# Patient Record
Sex: MALE | Race: OTHER | NOT HISPANIC OR LATINO
[De-identification: names, ages, dates, MRNs, and addresses within clinical notes are randomized per-mention and may not be internally consistent; named-entity substitution may affect disease eponyms.]

---

## 2022-11-09 ENCOUNTER — NON-APPOINTMENT (OUTPATIENT)
Age: 22
End: 2022-11-09

## 2022-11-09 ENCOUNTER — APPOINTMENT (OUTPATIENT)
Dept: ORTHOPEDIC SURGERY | Facility: CLINIC | Age: 22
End: 2022-11-09

## 2022-11-09 VITALS
HEART RATE: 80 BPM | HEIGHT: 70 IN | BODY MASS INDEX: 32.35 KG/M2 | DIASTOLIC BLOOD PRESSURE: 75 MMHG | WEIGHT: 226 LBS | SYSTOLIC BLOOD PRESSURE: 115 MMHG | OXYGEN SATURATION: 98 %

## 2022-11-09 PROBLEM — Z00.00 ENCOUNTER FOR PREVENTIVE HEALTH EXAMINATION: Status: ACTIVE | Noted: 2022-11-09

## 2022-11-09 PROCEDURE — 73130 X-RAY EXAM OF HAND: CPT | Mod: RT

## 2022-11-09 PROCEDURE — 29125 APPL SHORT ARM SPLINT STATIC: CPT | Mod: RT

## 2022-11-09 PROCEDURE — 99204 OFFICE O/P NEW MOD 45 MIN: CPT | Mod: 57

## 2022-11-11 ENCOUNTER — APPOINTMENT (OUTPATIENT)
Dept: CT IMAGING | Facility: IMAGING CENTER | Age: 22
End: 2022-11-11

## 2022-11-11 ENCOUNTER — RESULT REVIEW (OUTPATIENT)
Age: 22
End: 2022-11-11

## 2022-11-11 ENCOUNTER — OUTPATIENT (OUTPATIENT)
Dept: OUTPATIENT SERVICES | Facility: HOSPITAL | Age: 22
LOS: 1 days | End: 2022-11-11
Payer: MEDICAID

## 2022-11-11 ENCOUNTER — OUTPATIENT (OUTPATIENT)
Dept: OUTPATIENT SERVICES | Facility: HOSPITAL | Age: 22
LOS: 1 days | End: 2022-11-11

## 2022-11-11 VITALS
TEMPERATURE: 98 F | OXYGEN SATURATION: 98 % | HEIGHT: 70 IN | WEIGHT: 225.97 LBS | SYSTOLIC BLOOD PRESSURE: 133 MMHG | DIASTOLIC BLOOD PRESSURE: 75 MMHG | RESPIRATION RATE: 16 BRPM | HEART RATE: 83 BPM

## 2022-11-11 DIAGNOSIS — S62.314B: ICD-10-CM

## 2022-11-11 DIAGNOSIS — Z00.00 ENCOUNTER FOR GENERAL ADULT MEDICAL EXAMINATION WITHOUT ABNORMAL FINDINGS: ICD-10-CM

## 2022-11-11 DIAGNOSIS — R52 PAIN, UNSPECIFIED: Chronic | ICD-10-CM

## 2022-11-11 DIAGNOSIS — R52 PAIN, UNSPECIFIED: ICD-10-CM

## 2022-11-11 PROCEDURE — 76376 3D RENDER W/INTRP POSTPROCES: CPT

## 2022-11-11 PROCEDURE — 73200 CT UPPER EXTREMITY W/O DYE: CPT

## 2022-11-11 PROCEDURE — 73200 CT UPPER EXTREMITY W/O DYE: CPT | Mod: 26,RT

## 2022-11-11 PROCEDURE — 76376 3D RENDER W/INTRP POSTPROCES: CPT | Mod: 26

## 2022-11-11 NOTE — H&P PST ADULT - NSICDXPASTMEDICALHX_GEN_ALL_CORE_FT
PAST MEDICAL HISTORY:  Fracture fourth metacarpal base and fifth carpmetacrpal fracture in November 2022

## 2022-11-11 NOTE — H&P PST ADULT - HISTORY OF PRESENT ILLNESS
This is a 21 y/o male who presents with recent injury to his right hand when he punched the wall. Visited Urgent care and then saw specialist with xrays and CT scan confirming pathology. Splint applies. Scheduled for right closed versus open treatment fourth metacarpal base fracture fifth carpometacarpal fracture dislocation This is a 23 y/o male who presents with recent injury to his right hand when he punched the wall. Visited Urgent care and then saw specialist with xrays and CT scan confirming pathology. Splint applied. Scheduled for right closed versus open treatment fourth metacarpal base fracture fifth carpometacarpal fracture dislocation

## 2022-11-14 ENCOUNTER — TRANSCRIPTION ENCOUNTER (OUTPATIENT)
Age: 22
End: 2022-11-14

## 2022-11-14 VITALS
OXYGEN SATURATION: 99 % | TEMPERATURE: 98 F | DIASTOLIC BLOOD PRESSURE: 87 MMHG | RESPIRATION RATE: 16 BRPM | HEART RATE: 78 BPM | SYSTOLIC BLOOD PRESSURE: 132 MMHG | WEIGHT: 224.87 LBS | HEIGHT: 70 IN

## 2022-11-14 NOTE — ASU PREOPERATIVE ASSESSMENT, ADULT ([facility] ONLY) - FALL HARM RISK - HARM RISK INTERVENTIONS

## 2022-11-15 ENCOUNTER — APPOINTMENT (OUTPATIENT)
Dept: ORTHOPEDIC SURGERY | Facility: AMBULATORY SURGERY CENTER | Age: 22
End: 2022-11-15

## 2022-11-15 ENCOUNTER — OUTPATIENT (OUTPATIENT)
Dept: OUTPATIENT SERVICES | Facility: HOSPITAL | Age: 22
LOS: 1 days | Discharge: HOME | End: 2022-11-15

## 2022-11-15 ENCOUNTER — TRANSCRIPTION ENCOUNTER (OUTPATIENT)
Age: 22
End: 2022-11-15

## 2022-11-15 VITALS
SYSTOLIC BLOOD PRESSURE: 121 MMHG | HEART RATE: 81 BPM | RESPIRATION RATE: 81 BRPM | OXYGEN SATURATION: 100 % | DIASTOLIC BLOOD PRESSURE: 76 MMHG

## 2022-11-15 DIAGNOSIS — S62.314B: ICD-10-CM

## 2022-11-15 DIAGNOSIS — R52 PAIN, UNSPECIFIED: Chronic | ICD-10-CM

## 2022-11-15 PROCEDURE — 26686 TREAT HAND DISLOCATION: CPT | Mod: RT

## 2022-11-15 DEVICE — K-WIRE S&N DOUBLE TROCAR 0.045" X 4": Type: IMPLANTABLE DEVICE | Status: FUNCTIONAL

## 2022-11-15 RX ORDER — CEPHALEXIN 500 MG
1 CAPSULE ORAL
Qty: 40 | Refills: 0
Start: 2022-11-15 | End: 2022-11-24

## 2022-11-15 RX ORDER — OXYCODONE 5 MG/1
5 TABLET ORAL
Qty: 10 | Refills: 0 | Status: ACTIVE | COMMUNITY
Start: 2022-11-15 | End: 1900-01-01

## 2022-11-15 NOTE — ASU DISCHARGE PLAN (ADULT/PEDIATRIC) - CARE PROVIDER_API CALL
Lucio Negro)  Orthopedics  [phone] [place] [address]  Phone: [phone]  Fax: [phone]  Follow Up Time: 2 weeks

## 2022-11-28 ENCOUNTER — APPOINTMENT (OUTPATIENT)
Dept: ORTHOPEDIC SURGERY | Facility: CLINIC | Age: 22
End: 2022-11-28

## 2022-11-28 VITALS
WEIGHT: 226 LBS | DIASTOLIC BLOOD PRESSURE: 73 MMHG | TEMPERATURE: 97.1 F | HEART RATE: 84 BPM | OXYGEN SATURATION: 99 % | SYSTOLIC BLOOD PRESSURE: 130 MMHG | HEIGHT: 70 IN | BODY MASS INDEX: 32.35 KG/M2

## 2022-11-28 PROCEDURE — 99024 POSTOP FOLLOW-UP VISIT: CPT

## 2022-11-28 PROCEDURE — 73130 X-RAY EXAM OF HAND: CPT | Mod: RT

## 2022-11-28 PROCEDURE — 29130 APPL FINGER SPLINT STATIC: CPT | Mod: RT

## 2022-12-02 NOTE — HISTORY OF PRESENT ILLNESS
[de-identified] : Open reduction percutaneous pinning 4th and 5th CMC fracture dislocations\par 11/15/22 [de-identified] : Doing well\par Pain well controlled and not requiring narcotics\par Denies signs and symptoms of infection\par Denies sensory deficits/ changes [de-identified] : Sutures removed and steris applied without dehiscence\par Volar and dorsal slab splint fashioned with MP extended and immobilized but IP free (included digits 3-5)\par No evidence of erythema nor drainage about pins\par  [de-identified] : XR R Hand:  Stably reduced 4th and 5th CMC joints with stable hardware placement from prior [de-identified] : s/p aforementioned procedure and doing well\par \par OT to fashion thermoplast splint with MP extended and IPs free, forearm based (digits 3-5)\par NWB RUE\par Keep pins clean, dry and intact\par Call with questions or concerns

## 2022-12-06 RX ORDER — IBUPROFEN 200 MG
1 TABLET ORAL
Qty: 0 | Refills: 0 | DISCHARGE

## 2022-12-07 ENCOUNTER — APPOINTMENT (OUTPATIENT)
Dept: ORTHOPEDIC SURGERY | Facility: CLINIC | Age: 22
End: 2022-12-07
Payer: MEDICAID

## 2022-12-07 PROCEDURE — 73130 X-RAY EXAM OF HAND: CPT | Mod: RT

## 2022-12-07 PROCEDURE — 99024 POSTOP FOLLOW-UP VISIT: CPT

## 2022-12-12 ENCOUNTER — APPOINTMENT (OUTPATIENT)
Dept: ORTHOPEDIC SURGERY | Facility: CLINIC | Age: 22
End: 2022-12-12
Payer: MEDICAID

## 2022-12-12 PROCEDURE — 99024 POSTOP FOLLOW-UP VISIT: CPT

## 2022-12-18 NOTE — HISTORY OF PRESENT ILLNESS
[de-identified] : Open reduction percutaneous pinning 4th and 5th CMC fracture dislocations\par 11/15/22 [de-identified] : Doing much better with pain\par OT had adjusted splint to keep MP extended [de-identified] : Incision c/d/i and appears well-healing\par Thermoplast splint in place\par No evidence of erythema nor drainage about pins\par  [de-identified] : s/p aforementioned procedure and doing well\par \par  [de-identified] : Keep pins c/d/i\par F/u in 1-2 weeks for pins out and XR with pins removed

## 2022-12-18 NOTE — HISTORY OF PRESENT ILLNESS
[de-identified] : Open reduction percutaneous pinning 4th and 5th CMC fracture dislocations\par 11/15/22 [de-identified] : Had issues with pain and swelling after running in his splint\par This has improved [de-identified] : Incision c/d/i and appears well-healing\par Thermoplast splint in place\par No evidence of erythema nor drainage about pins- however pin did migrate inwards (still external to skin) with no pressure erosion at skin edge\par  [de-identified] : XR R Hand: Maintained reduction of R 4th and 5th CMC fracture dislocations with stable hardware placement [de-identified] : s/p aforementioned procedure and doing well\par \par  [de-identified] : Keep pins c/d/i\par F/u in 2 weeks for pins out and XR with pins removed

## 2022-12-21 ENCOUNTER — APPOINTMENT (OUTPATIENT)
Dept: ORTHOPEDIC SURGERY | Facility: CLINIC | Age: 22
End: 2022-12-21
Payer: MEDICAID

## 2022-12-21 PROBLEM — T14.8XXA OTHER INJURY OF UNSPECIFIED BODY REGION, INITIAL ENCOUNTER: Chronic | Status: ACTIVE | Noted: 2022-11-11

## 2022-12-21 PROCEDURE — 99024 POSTOP FOLLOW-UP VISIT: CPT

## 2022-12-21 PROCEDURE — 73130 X-RAY EXAM OF HAND: CPT | Mod: RT

## 2023-01-11 ENCOUNTER — APPOINTMENT (OUTPATIENT)
Dept: ORTHOPEDIC SURGERY | Facility: CLINIC | Age: 23
End: 2023-01-11
Payer: MEDICAID

## 2023-01-11 PROCEDURE — 99024 POSTOP FOLLOW-UP VISIT: CPT

## 2023-01-11 PROCEDURE — 73130 X-RAY EXAM OF HAND: CPT | Mod: RT

## 2023-02-15 ENCOUNTER — APPOINTMENT (OUTPATIENT)
Dept: ORTHOPEDIC SURGERY | Facility: CLINIC | Age: 23
End: 2023-02-15
Payer: MEDICAID

## 2023-02-15 DIAGNOSIS — S69.91XD UNSPECIFIED INJURY OF RIGHT WRIST, HAND AND FINGER(S), SUBSEQUENT ENCOUNTER: ICD-10-CM

## 2023-02-15 PROCEDURE — 73130 X-RAY EXAM OF HAND: CPT | Mod: RT

## 2023-02-15 PROCEDURE — 99212 OFFICE O/P EST SF 10 MIN: CPT

## 2023-05-19 ENCOUNTER — APPOINTMENT (OUTPATIENT)
Dept: ORTHOPEDIC SURGERY | Facility: CLINIC | Age: 23
End: 2023-05-19

## 2024-04-26 NOTE — H&P PST ADULT - PROBLEM SELECTOR PLAN 1
calm This is a 21 y/o male who is scheduled for right closed versus open treatment fourth metacarpal base fracture, fifth carpometacarpal fracture dislocation on 11-15-22  * Ordered covid-19 pcr; given phone number and places that do covid testing to be done 3-5 days before surgery   * Given preop instructions

## 2024-05-27 NOTE — ASU PREOP CHECKLIST - HAND OFF
Goal Outcome Evaluation:  Plan of Care Reviewed With: patient        Progress: no change      Vss. No acute changes. Rested well. Will continue plan of care.                            Unit RN to OR RN

## 2024-11-18 ENCOUNTER — EMERGENCY (EMERGENCY)
Facility: HOSPITAL | Age: 24
LOS: 0 days | Discharge: HOME | End: 2024-11-18
Attending: STUDENT IN AN ORGANIZED HEALTH CARE EDUCATION/TRAINING PROGRAM
Payer: COMMERCIAL

## 2024-11-18 VITALS
TEMPERATURE: 100 F | HEART RATE: 68 BPM | SYSTOLIC BLOOD PRESSURE: 131 MMHG | WEIGHT: 188.94 LBS | DIASTOLIC BLOOD PRESSURE: 67 MMHG | OXYGEN SATURATION: 100 % | RESPIRATION RATE: 18 BRPM | HEIGHT: 70 IN
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
## 2024-11-18 DIAGNOSIS — S92.325A NONDISPLACED FRACTURE OF SECOND METATARSAL BONE, LEFT FOOT, INITIAL ENCOUNTER FOR CLOSED FRACTURE: ICD-10-CM
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
## 2024-11-18 DIAGNOSIS — S92.342A DISPLACED FRACTURE OF FOURTH METATARSAL BONE, LEFT FOOT, INITIAL ENCOUNTER FOR CLOSED FRACTURE: ICD-10-CM

## 2024-11-18 DIAGNOSIS — R52 PAIN, UNSPECIFIED: Chronic | ICD-10-CM

## 2024-11-18 DIAGNOSIS — M79.672 PAIN IN LEFT FOOT: ICD-10-CM
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
## 2024-11-18 PROCEDURE — 99282 EMERGENCY DEPT VISIT SF MDM: CPT

## 2024-11-18 PROCEDURE — 73630 X-RAY EXAM OF FOOT: CPT | Mod: 26,LT

## 2024-11-18 PROCEDURE — 73590 X-RAY EXAM OF LOWER LEG: CPT | Mod: LT

## 2024-11-18 PROCEDURE — 76000 FLUOROSCOPY <1 HR PHYS/QHP: CPT

## 2024-11-18 PROCEDURE — 73590 X-RAY EXAM OF LOWER LEG: CPT | Mod: 26,LT

## 2024-11-18 PROCEDURE — 73630 X-RAY EXAM OF FOOT: CPT | Mod: LT

## 2024-11-18 PROCEDURE — 73610 X-RAY EXAM OF ANKLE: CPT | Mod: LT

## 2024-11-18 PROCEDURE — 99285 EMERGENCY DEPT VISIT HI MDM: CPT

## 2024-11-18 PROCEDURE — 28475 CLTX METATARSAL FX W/MNPJ EA: CPT | Mod: T3

## 2024-11-18 PROCEDURE — 73610 X-RAY EXAM OF ANKLE: CPT | Mod: 26,LT

## 2024-11-18 PROCEDURE — 99285 EMERGENCY DEPT VISIT HI MDM: CPT | Mod: 25

## 2024-11-18 NOTE — ED PROVIDER NOTE - NSFOLLOWUPINSTRUCTIONS_ED_ALL_ED_FT
1.  Please follow-up with Dr. Randolph from podiatry regarding your foot fractures.  2.  Please use the crutches whenever up and moving around.  3.  Please use ice to help with swelling.  4.  You can take acetaminophen and ibuprofen as needed for pain.  5.  Please return to the ER if develop worsening swelling, numbness or tingling, fever, redness of your foot or anything of concern.

## 2024-11-18 NOTE — CONSULT NOTE ADULT - ASSESSMENT
A: 24yr old male seen for the following:   - Left foot displaced fracture of the fourth metatarsal head  - Left foot non-displaced fracture of the third and second metartaral    P:   Chart reviewed and Patient evaluated;  Discussed diagnosis and treatment with patient.   X-rays reviewed : on wet read showing left foot displaced fracture of the fourth metatarsal head and non displaced fx of the third and second metatarsals  The displaced fracture of the fourth metatarsal left foot was partially reduced after administering 20cc of 1%plaine lidocaine under c arm fluoroscopy. Please see procedure note for detail.  Applied soft gibbs compressive dressing and posterior splint  Please leave dressings clean, dry and intact  Non-weight bearing  to left foot   RICE therapy to the left foot  Pt to f/u with Dr Rosario within 1 week of discharge  All additional care per ED appreciated  Patient demonstrated verbal understanding of all interventions and tolerated interventions well without any complications.         Case D/W attending Dr. Rosario

## 2024-11-18 NOTE — ED PROVIDER NOTE - PHYSICAL EXAMINATION
GEN: Patient awake alert NAD.   HEENT: normocephalic, atraumatic, EOMI, no scleral icterus, moist MM  CARDIAC: RRR, S1, S2, no murmur.   PULM: CTA B/L no wheeze, rhonchi, rales.   ABD: soft NT, ND, no rebound no guarding,  MSK: Moving all extremities, no edema. 5/5 strength and full ROM in all extremities. Tenderness of the midfoot of the left foot, distally neurovascular intact, no ankle tenderness, normal sensation    NEURO: A&Ox3, no focal neurological deficits  SKIN: warm, dry, no rash.

## 2024-11-18 NOTE — ED PROVIDER NOTE - CARE PROVIDER_API CALL
Jatin Rosario  Podiatric Medicine and Surgery  [address] [place] [address], [address]  [place] [address]  Phone: [phone]  Fax: [phone]  Follow Up Time: 4-6 Days

## 2024-11-18 NOTE — ED ADULT TRIAGE NOTE - CHIEF COMPLAINT QUOTE
pt bibems from work s/p slip and fall off a roof approx. 12 feet. C/o left ankle pain. - head strike, - LOC, - blood thinners. -pmhx. VS stable, pt A&ox4. Denies any cp, sob, numbness or tingling in hands or feet.

## 2024-11-18 NOTE — ED PROVIDER NOTE - PRO INTERPRETER NEED 2
Assessment & Plan     Type 2 diabetes mellitus with complication, without long-term current use of insulin (H)  Stop Jardiance, not tolerated and worsened diabetic control , resume Glipizide   - metFORMIN (GLUCOPHAGE-XR) 500 MG 24 hr tablet; TAKE 2 TABLETS BY MOUTH TWICE DAILY WITH MEALS  - glipiZIDE (GLIPIZIDE XL) 10 MG 24 hr tablet; Take 1 tablet (10 mg) by mouth daily    Urinary frequency  Assess UA , likely UTI, will start antibiotic according to results , consider adding Flomax , bladder US  - UA macro with reflex to Microscopic and Culture - Clinc Collect  - Urine Microscopic  - Urine Culture             See Patient Instructions    Return in about 3 months (around 5/16/2022) for Routine Visit.    Alvaro Lindo MD  [facility] ROJELIO Galeas is a 77 year old who presents for the following health issues  accompanied by his spouse.    History of Present Illness     Reason for visit:  Bladder problem.  Symptoms include:  Bladder problem.  Symptom intensity:  Severe  Symptom progression:  Staying the same  Had these symptoms before:  No  What makes it worse:  No. Not today.  What makes it better:  Nothing That is why I am here, Today.    He eats 2-3 servings of fruits and vegetables daily.He exercises with enough effort to increase his heart rate 10 to 19 minutes per day.  He is missing 7 dose(s) of medications per week.  He is not taking prescribed medications regularly due to side effects.       Presents with sympoms of urinary frequency, burning and incontinence. Present for 4 days. Has suprapubic and flank tenderness. No fevers, chills. No nausea, vomiting. No prior history of UTIs.  Has H/O DM. On diet , exercise and Metformin and Jardiance. Blood sugars are not controlled. No parestesias. No hypoglycemias. Blood sugars are over 200 since  Started Jardiance. Has developed symptoms of UTI as well , possible medication side effects.         Review of Systems 
  Constitutional, HEENT, cardiovascular, pulmonary, gi and gu systems are negative, except as otherwise noted.      Objective    There were no vitals taken for this visit.  There is no height or weight on file to calculate BMI.  Physical Exam   GENERAL: healthy, alert and no distress  NECK: no adenopathy, no asymmetry, masses, or scars and thyroid normal to palpation  RESP: lungs clear to auscultation - no rales, rhonchi or wheezes  CV: regular rate and rhythm, normal S1 S2, no S3 or S4, no murmur, click or rub, no peripheral edema and peripheral pulses strong  ABDOMEN: soft, nontender, no hepatosplenomegaly, no masses and bowel sounds normal, lower abdominal palpatory tenderness   MS: no gross musculoskeletal defects noted, no edema    Ancillary Procedure on 02/15/2022   Component Date Value Ref Range Status     Date Time Interrogation Session 02/15/2022 [id number]   Final     Implantable Pulse Generator Manufa* 02/15/2022 [employer]   Final     Implantable Pulse Generator Model 02/15/2022 G447 RESONATE X4 CRT-D   Final     Implantable Pulse Generator Serial* 02/15/2022 [id number]   Final     Type Interrogation Session 02/15/2022 In Clinic   Final     Clinic Name 02/15/2022 [place]   Final     Implantable Pulse Generator Type 02/15/2022 Cardiac Resynchronization Therapy - Defibrillator   Final     Implantable Pulse Generator Implan* 02/15/2022 [id number]   Final     Implantable Lead  02/15/2022 [employer]   Final     Implantable Lead Model 02/15/2022 0275 Wilson 4-Site   Final     Implantable Lead Serial Number 02/15/2022 [id number]   Final     Implantable Lead Implant Date 02/15/2022 [id number]   Final     Implantable Lead Polarity Type 02/15/2022 Tripolar Lead   Final     Implantable Lead Location Detail 1 02/15/2022 UNKNOWN   Final     Implantable Lead Location 02/15/2022 Right Ventricle   Final     Implantable Lead  02/15/2022 [employer]   Final     Implantable Lead 
Model 02/15/2022 4671 Acuity X4 Straight   Final     Implantable Lead Serial Number 02/15/2022 [id number]   Final     Implantable Lead Implant Date 02/15/2022 [id number]   Final     Implantable Lead Polarity Type 02/15/2022 Quadripolar Lead   Final     Implantable Lead Location Detail 1 02/15/2022 UNKNOWN   Final     Implantable Lead Location 02/15/2022 Left Ventricle   Final     Implantable Lead  02/15/2022 [employer]   Final     Implantable Lead Model 02/15/2022 7840 Ingevity + MRI   Final     Implantable Lead Serial Number 02/15/2022 [id number]   Final     Implantable Lead Implant Date 02/15/2022 [id number]   Final     Implantable Lead Polarity Type 02/15/2022 Bipolar Lead   Final     Implantable Lead Location Detail 1 02/15/2022 UNKNOWN   Final     Implantable Lead Location 02/15/2022 Right Atrium   Final     Ant Setting Mode (NBG Code) 02/15/2022 DDD   Final     Atn Setting Lower Rate Limit 02/15/2022 60  [beats]/min Final     Ant Setting Maximum Tracking Rate 02/15/2022 130  [beats]/min Final     Ant Setting Maximum Sensor Rate 02/15/2022 130  [beats]/min Final     Ant Setting Hysterisis Rate 02/15/2022 Off   Final     Ant Setting MARCELO Delay Low 02/15/2022 80.0  ms Final     Ant Setting PAV Delay Low 02/15/2022 180.0  ms Final     Ant Setting PAV Delay High 02/15/2022 180.0  ms Final     Ant Setting MARCELO Delay High 02/15/2022 80.0  ms Final     Ant Setting AT Mode Switch Rate 02/15/2022 170  [beats]/min Final     Ant Setting AT Mode Switch Mode 02/15/2022 VDIR   Final     Lead Channel Setting Sensing Polar* 02/15/2022 Bipolar   Final     Lead Channel Setting Sensing Sensi* 02/15/2022 0.25  mV Final     Lead Channel Setting Sensing Adapt* 02/15/2022 Adaptive   Final     Lead Channel Setting Sensing Polar* 02/15/2022 Bipolar   Final     Lead Channel Setting Sensing Sensi* 02/15/2022 0.6  mV Final     Lead Channel Setting Sensing Adapt* 02/15/2022 Adaptive   Final     Lead Channel 
Setting Sensing Polar* 02/15/2022 Unknown   Final     Lead Channel Setting Sensing Sensi* 02/15/2022 1.0  mV Final     Lead Channel Setting Sensing Adapt* 02/15/2022 Adaptive   Final     Ventricular chambers paced during * 02/15/2022 LVOnly   Final     CRT LV-RV Delay 02/15/2022 30.0  ms Final     Lead Channel Setting Pacing Polari* 02/15/2022 Bipolar   Final     Lead Channel Setting Pacing Pulse * 02/15/2022 0.4  ms Final     Lead Channel Setting Pacing Amplit* 02/15/2022 2.0  V Final     Lead Channel Setting Pacing Captur* 02/15/2022 Adaptive   Final     Lead Channel Setting Pacing Polari* 02/15/2022 Bipolar   Final     Lead Channel Setting Pacing Pulse * 02/15/2022 0.4  ms Final     Lead Channel Setting Pacing Amplit* 02/15/2022 4.0  V Final     Lead Channel Setting Pacing Captur* 02/15/2022 Adaptive   Final     Lead Channel Setting Pacing Polari* 02/15/2022 Unknown   Final     Lead Channel Setting Pacing Pulse * 02/15/2022 0.5  ms Final     Lead Channel Setting Pacing Amplit* 02/15/2022 2.7  V Final     Lead Channel Setting Pacing Captur* 02/15/2022 Adaptive   Final     Zone Setting Type Category 02/15/2022 VF   Final     Zone Setting Vendor Type Category 02/15/2022 VF   Final     Zone Setting Detection Interval 02/15/2022 250.0  ms Final     Zone Setting Type Category 02/15/2022 VT   Final     Zone Setting Vendor Type Category 02/15/2022 VT   Final     Zone Setting Detection Interval 02/15/2022 300.0  ms Final     Zone Setting Type Category 02/15/2022 VT   Final     Zone Setting Vendor Type Category 02/15/2022 VT-1   Final     Zone Setting Detection Interval 02/15/2022 353.0  ms Final     Lead Channel Impedance Value 02/15/2022 2,012.0  ohm Final     Lead Channel Sensing Intrinsic Amp* 02/15/2022 25  mV Final     Lead Channel Pacing Threshold Ampl* 02/15/2022 1.4000  V Final     Lead Channel Pacing Threshold Puls* 02/15/2022 0.5  ms Final     Lead Channel Impedance Value 02/15/2022 720.0  ohm Final     Lead 
Channel Sensing Intrinsic Amp* 02/15/2022 2.9  mV Final     Lead Channel Pacing Threshold Ampl* 02/15/2022 1.3000  V Final     Lead Channel Pacing Threshold Puls* 02/15/2022 0.4  ms Final     Lead Channel Impedance Value 02/15/2022 460.0  ohm Final     Lead Channel Sensing Intrinsic Amp* 02/15/2022 25  mV Final     Lead Channel Pacing Threshold Ampl* 02/15/2022 2.0000  V Final     Lead Channel Pacing Threshold Puls* 02/15/2022 0.4  ms Final     Battery Status 02/15/2022 Beginning of Service   Final     Capacitor Charge Type 02/15/2022 Reformation   Final     Capacitor Last Charge Date Time 02/15/2022 Mon Dec 27 07:12:37 CST 2021   Final     Capacitor Charge Time 02/15/2022 9.048   Final     Capacitor Charge Type 02/15/2022 Shock   Final     Capacitor Charge Time 02/15/2022 0  s Final     Capacitor Charge Energy 02/15/2022 0  J Final     Ant Statistic RA Percent Paced 02/15/2022 1  % Final     Ant Statistic RV Percent Paced 02/15/2022 28  % Final     CRT Statistic LV Percent Paced 02/15/2022 99  % Final     Therapy Statistic Total Shocks Del* 02/15/2022 0   Final     Therapy Statistic Total Shocks Abo* 02/15/2022 0   Final     Therapy Statistic Total ATP Delive* 02/15/2022 0   Final     Therapy Statistic Total  Date Time* 02/15/2022 58144470129012   Final     Therapy Statistic Recent Shocks De* 02/15/2022 0   Final     Therapy Statistic Recent Shocks Ab* 02/15/2022 0   Final     Therapy Statistic Recent ATP Deliv* 02/15/2022 0   Final     Therapy Statistic Recent Date Time* 02/15/2022 Wed Siva 05 02:28:00 CST 2022   Final     Therapy Statistic Recent Date Time* 02/15/2022 [id number]   Final     Episode Statistic Total Count 02/15/2022 0   Final     Episode Statistic Type Category 02/15/2022 VT   Final     Episode Statistic Vendor Type Tania* 02/15/2022 NSVT   Final     Episode Statistic Total Count 02/15/2022 0   Final     Episode Statistic Type Category 02/15/2022 VF   Final     Episode Statistic Vendor Type 
Oriented - self; Oriented - place; Oriented - time
Tania* 02/15/2022 VF   Final     Episode Statistic Total Count 02/15/2022 0   Final     Episode Statistic Type Category 02/15/2022 [email or web address]   Final     Episode Statistic Total Date Time * 02/15/2022 82287363808478   Final     Episode Statistic Total Date Time * 02/15/2022 08028793692643   Final     Episode Statistic Total Date Time * 02/15/2022 98719237216020   Final     Episode Statistic Recent Count 02/15/2022 0   Final     Episode Statistic Type Category 02/15/2022 VT   Final     Episode Statistic Vendor Type Tania* 02/15/2022 NSVT   Final     Episode Statistic Recent Count 02/15/2022 0   Final     Episode Statistic Type Category 02/15/2022 VF   Final     Episode Statistic Vendor Type Tania* 02/15/2022 VF   Final     Episode Statistic Recent Count 02/15/2022 0   Final     Episode Statistic Type Category 02/15/2022 [email or web address]   Final     Episode Statistic Recent Date Time* 02/15/2022 Wed Siva 05 02:28:00 CST 2022   Final     Episode Statistic Recent Date Time* 02/15/2022 [id number]   Final     Episode Statistic Recent Date Time* 02/15/2022 Wed Siva 05 02:28:00 CST 2022   Final     Episode Statistic Recent Date Time* 02/15/2022 [id number]   Final     Episode Statistic Recent Date Time* 02/15/2022 Wed Siva 05 02:28:00 CST 2022   Final     Episode Statistic Recent Date Time* 02/15/2022 [id number]   Final               
English

## 2024-11-18 NOTE — CONSULT NOTE ADULT - ATTENDING COMMENTS
left foot displaced fracture of the fourth metatarsal head and non displaced fx of the third and second metatarsals  Interval improvement based on closed reduction xrays   Follow up outpatient within 1 week  Agree with resident plan of care

## 2024-11-18 NOTE — PROCEDURE NOTE - NSPOSTCAREGUIDE_GEN_A_CORE
Verbal/written post procedure instructions were given to patient/caregiver/Instructed patient/caregiver to follow-up with primary care physician/Elevate the injured extremity as instructed/Keep the cast/splint/dressing clean and dry

## 2024-11-18 NOTE — ED PROVIDER NOTE - OBJECTIVE STATEMENT
24-year-old male no past medical history presents ED complaining of left foot pain status post fall off a roof.  Patient did not hit his head or suffer any other injuries.  Does not take blood thinners.

## 2024-11-18 NOTE — ED PROVIDER NOTE - PROGRESS NOTE DETAILS
Juvenal Cerda DO (Attending): Patient with multiple metatarsal fractures will consult podiatry Juvenal Cerda DO (Attending): Patient seen and splinted by podiatry, partial reduction of the fourth metatarsal but not complete.  Patient placed in a posterior splint and placed on crutches by podiatry.  Will discharge patient to follow-up with podiatry in 1 week.

## 2024-11-18 NOTE — ED ADULT NURSE NOTE - OBJECTIVE STATEMENT
pt presents to ED BIB EMS s/p fall c/o left ankle pain. Pt states "I was at work, and feel off the ladder, about 15ft,  I slid down and landed on my left foot w/ all my body weight." Pt states he is unable to put weight on affected ankle w/o feeling a lot of pain 8/10. Pt denies head strike, LOC. PMH broken right hand, no other pertinent medical hx. Upon assessment left ankle is edematous, not ecchymosis, pain to palpation, sensation intact. Safety and comfort measures in place, no other complaints at this time.

## 2024-11-18 NOTE — PROCEDURE NOTE - ADDITIONAL PROCEDURE DETAILS
Procedure:  The benefits and risks of fracture were explained to the patient, whom agreed verbally to proceed with Left 4th metatarsal reduction.     Under aseptic conditions, 20cc of 1% lidocaine plain were infiltrated to the left foot in a reverse quigley block fashion. Anesthesia digital block check was performed and verified. Next using a combination of manual manipulation and pole with weights, Left distal 4th metatarsal fracture was attempted to be brought back to length. After approx 5 attempts, Left 4th metatarsal fracture was partially reduced within an acceptable parabola alignment and metatarsal length. Reduction was confirmed with usage of c-arm fluoroscopy. Patient was neurovascularly intact after procedure.

## 2024-11-18 NOTE — ED PROVIDER NOTE - PATIENT PORTAL LINK FT
You can access the FollowMyHealth Patient Portal offered by [facility] by registering at the following website: [email or web address]. By joining [email or web address] FollowMyHealth portal, you will also be able to view your health information using other applications (apps) compatible with our system.

## 2024-11-18 NOTE — ED PROVIDER NOTE - CLINICAL SUMMARY MEDICAL DECISION MAKING FREE TEXT BOX
Juvenal Cerda DO (Attending): 24-year-old male no past medical history presents ED complaining of left foot pain status post fall off a roof.  Patient with tenderness over the left midfoot, no other signs of trauma and left foot is distally neurovascular tact.  Will rule out fracture with x-ray.  Reassess no other concerns for traumatic injuries at this time.

## 2024-11-18 NOTE — ED ADULT NURSE NOTE - NSFALLUNIVINTERV_ED_ALL_ED
Bed/Stretcher in lowest position, wheels locked, appropriate side rails in place/Call bell, personal items and telephone in reach/Instruct patient to call for assistance before getting out of bed/chair/stretcher/Non-slip footwear applied when patient is off stretcher/[place] to call system/Physically safe environment - no spills, clutter or unnecessary equipment/Purposeful proactive rounding/Room/bathroom lighting operational, light cord in reach

## 2024-11-18 NOTE — CONSULT NOTE ADULT - SUBJECTIVE AND OBJECTIVE BOX
Date of consult: 11/18/2024    HPI: 24-year-old male no past medical history presents ED complaining of left foot pain status post fall off a roof.  Patient did not hit his head or suffer any other injuries.  Does not take blood thinners. Pt states he fell down from the roof while putting melecio lights aprox 2 hours before coming to the ED. No open lacerations noted by pt. Pt noted some swelling and pain to the left foot.       PMH: Pain due to fracture    Fracture      PSH:Pain        Allergies:No Known Allergies      Labs:      WBC Trend      Chem              T(F): 100 (11-18-24 @ 11:22), Max: 100 (11-18-24 @ 11:22)  HR: 68 (11-18-24 @ 11:22) (68 - 68)  BP: 131/67 (11-18-24 @ 11:22) (131/67 - 131/67)  RR: 18 (11-18-24 @ 11:22) (18 - 18)  SpO2: 100% (11-18-24 @ 11:22) (100% - 100%)  Wt(kg): --    REVIEW OF SYSTEMS:    CONSTITUTIONAL: No weakness, fevers or chills  EYES: No visual changes  RESPIRATORY: No cough, wheezing; No shortness of breath  CARDIOVASCULAR: No chest pain or palpitations  GASTROINTESTINAL: No abdominal or epigastric pain. No nausea, vomiting; No diarrhea or constipation.   GENITOURINARY: No dysuria, frequency or hematuria  NEUROLOGICAL: No numbness or weakness  SKIN: See physical examination.  All other review of systems is negative unless indicated above    Physical Exam:   Constitutional: NAD, alert;  Lower Extremity Focus  Derm:  Skin warm, dry and supple left foot  No open lesions noted to the left foot, mild edema noted to the lateral midfoot.   Vascular: Dorsalis Pedis and Posterior Tibial pulses 2/4.  Capillary re-fill time less then 3 seconds digits 1-5 left foot  Neuro: Protective sensation intact to the level of the digits left foot  MSK: Muscle strength 5/5 all major muscle groups left foot

## 2024-11-23 ENCOUNTER — APPOINTMENT (OUTPATIENT)
Dept: PODIATRY | Facility: CLINIC | Age: 24
End: 2024-11-23

## 2024-11-23 VITALS — BODY MASS INDEX: 27.49 KG/M2 | WEIGHT: 192 LBS | HEIGHT: 70 IN

## 2024-11-23 DIAGNOSIS — S92.324D NONDISPLACED FRACTURE OF SECOND METATARSAL BONE, RIGHT FOOT, SUBSEQUENT ENCOUNTER FOR FRACTURE WITH ROUTINE HEALING: ICD-10-CM

## 2024-11-23 DIAGNOSIS — S92.341D DISPLACED FRACTURE OF FOURTH METATARSAL BONE, RIGHT FOOT, SUBSEQUENT ENCOUNTER FOR FRACTURE WITH ROUTINE HEALING: ICD-10-CM

## 2024-11-23 DIAGNOSIS — S92.334D NONDISPLACED FRACTURE OF THIRD METATARSAL BONE, RIGHT FOOT, SUBSEQUENT ENCOUNTER FOR FRACTURE WITH ROUTINE HEALING: ICD-10-CM

## 2024-11-23 PROCEDURE — 73630 X-RAY EXAM OF FOOT: CPT | Mod: RT

## 2024-11-23 PROCEDURE — 99214 OFFICE O/P EST MOD 30 MIN: CPT

## 2024-12-06 ENCOUNTER — APPOINTMENT (OUTPATIENT)
Dept: PODIATRY | Facility: CLINIC | Age: 24
End: 2024-12-06
Payer: COMMERCIAL

## 2024-12-06 DIAGNOSIS — S92.342A DISPLACED FRACTURE OF FOURTH METATARSAL BONE, LEFT FOOT, INITIAL ENCOUNTER FOR CLOSED FRACTURE: ICD-10-CM

## 2024-12-06 DIAGNOSIS — S92.335A NONDISPLACED FRACTURE OF THIRD METATARSAL BONE, LEFT FOOT, INITIAL ENCOUNTER FOR CLOSED FRACTURE: ICD-10-CM

## 2024-12-06 PROCEDURE — 99213 OFFICE O/P EST LOW 20 MIN: CPT

## 2024-12-06 PROCEDURE — 73630 X-RAY EXAM OF FOOT: CPT | Mod: LT

## 2024-12-30 ENCOUNTER — APPOINTMENT (OUTPATIENT)
Dept: PODIATRY | Facility: CLINIC | Age: 24
End: 2024-12-30
Payer: COMMERCIAL

## 2024-12-30 DIAGNOSIS — S92.334D NONDISPLACED FRACTURE OF THIRD METATARSAL BONE, RIGHT FOOT, SUBSEQUENT ENCOUNTER FOR FRACTURE WITH ROUTINE HEALING: ICD-10-CM

## 2024-12-30 DIAGNOSIS — S92.342A DISPLACED FRACTURE OF FOURTH METATARSAL BONE, LEFT FOOT, INITIAL ENCOUNTER FOR CLOSED FRACTURE: ICD-10-CM

## 2024-12-30 PROCEDURE — 73630 X-RAY EXAM OF FOOT: CPT | Mod: LT

## 2024-12-30 PROCEDURE — 99213 OFFICE O/P EST LOW 20 MIN: CPT

## 2025-01-27 ENCOUNTER — APPOINTMENT (OUTPATIENT)
Dept: PODIATRY | Facility: CLINIC | Age: 25
End: 2025-01-27
Payer: COMMERCIAL

## 2025-01-27 DIAGNOSIS — S92.324D NONDISPLACED FRACTURE OF SECOND METATARSAL BONE, RIGHT FOOT, SUBSEQUENT ENCOUNTER FOR FRACTURE WITH ROUTINE HEALING: ICD-10-CM

## 2025-01-27 DIAGNOSIS — S92.341D DISPLACED FRACTURE OF FOURTH METATARSAL BONE, RIGHT FOOT, SUBSEQUENT ENCOUNTER FOR FRACTURE WITH ROUTINE HEALING: ICD-10-CM

## 2025-01-27 PROCEDURE — 99213 OFFICE O/P EST LOW 20 MIN: CPT

## (undated) DEVICE — SOL IRR POUR NS 0.9% 500ML

## (undated) DEVICE — ELCTR GROUNDING PAD ADULT COVIDIEN

## (undated) DEVICE — DRSG COBAN 4"

## (undated) DEVICE — SUT MONOCRYL 4-0 18" PS-2

## (undated) DEVICE — DRSG STERISTRIPS 0.5 X 4"

## (undated) DEVICE — CANISTER DISPOSABLE THIN WALL 3000CC

## (undated) DEVICE — PACK HAND TRAY

## (undated) DEVICE — WARMING BLANKET LOWER ADULT

## (undated) DEVICE — NDL HYPO SAFE 18G X 1.5" (PINK)

## (undated) DEVICE — SUT VICRYL 3-0 18" SH (POP-OFF)

## (undated) DEVICE — GLV 8 PROTEXIS (CREAM) NEU-THERA

## (undated) DEVICE — DRSG DERMABOND 0.7ML

## (undated) DEVICE — VENODYNE/SCD SLEEVE CALF MEDIUM

## (undated) DEVICE — SUT VICRYL 0 27" CT

## (undated) DEVICE — FRAZIER SUCTION TIP 8FR

## (undated) DEVICE — SYR LUER LOK 10CC

## (undated) DEVICE — DRAPE C ARM UNIVERSAL

## (undated) DEVICE — BIPOLAR FORCEP KIRWAN JEWELERS STR 4" X 0.4MM W 12FT CORD (GREEN)

## (undated) DEVICE — POSITIONER STRAP ARMBOARD VELCRO TS-30